# Patient Record
Sex: MALE | Race: WHITE | Employment: FULL TIME | URBAN - METROPOLITAN AREA
[De-identification: names, ages, dates, MRNs, and addresses within clinical notes are randomized per-mention and may not be internally consistent; named-entity substitution may affect disease eponyms.]

---

## 2020-05-15 ENCOUNTER — VIRTUAL VISIT (OUTPATIENT)
Dept: PHYSICAL THERAPY | Facility: CLINIC | Age: 55
End: 2020-05-15
Payer: COMMERCIAL

## 2020-05-15 DIAGNOSIS — M25.511 CHRONIC RIGHT SHOULDER PAIN: ICD-10-CM

## 2020-05-15 DIAGNOSIS — G89.29 CHRONIC RIGHT SHOULDER PAIN: ICD-10-CM

## 2020-05-15 PROCEDURE — 97110 THERAPEUTIC EXERCISES: CPT | Mod: 95 | Performed by: PHYSICAL THERAPIST

## 2020-05-15 PROCEDURE — 97161 PT EVAL LOW COMPLEX 20 MIN: CPT | Mod: 95 | Performed by: PHYSICAL THERAPIST

## 2020-05-15 NOTE — PROGRESS NOTES
"Physical Therapy Virtual Follow Up Visit      The patient has been notified of following:     \"This virtual visit will be conducted between you and your provider. We have found that certain health care needs can be provided without the need for physical presence.  This service lets us provide the care you need with a virtual visit.\"    Due to external, as well as internal [facility] management of the COVID-19 Virus, Samuel Cuevas was not seen in our clinic.  As a substitution, we implemented a virtual visit to manage this patient's condition utilizing the PTRx virtual visit platform via the patient s existing code.  The provider, Kolby Reza, reviewed the patient's chart, PTRx prescription, and spoke with the patient to determine the following telemedicine visit is appropriate and effective for the patient's care.    The following type of visit was completed:   Video Visit:  The PTRx platform uses a synchronous HIPAA compliant video stream for this patient encounter.      [facility]: Physical Therapy Initial Evaluation   May 15, 2020  Comments/Precautions/Restrictions/Physician instructions:   Per Orders: Right side rotator cuff tendinitis    Subjective:   Chief Complaint: right greater than left shoulder pain   Pain: top of both shoulders, right more than left   Numbness/Tingling: none   Weakness: none   Stiffness: tension in the front of shoulders, and in the shoulder overall.   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, driving  Previous Treatment (Effect): aspirin/ibuprofen (not consistently, helps a little)  Imaging: None for the shoulder  AM/PM: worse as the day goes on  Quality of Pain: aches, can be fairly sharp,  Pain: 2/10 at present, 2/10 at best, 6/10 at worst  Worse: laying on either side, mostly in the evening, putting too much weight on it, reaching (up, twisting), putting on a coat,   Better: meds,   Progression of Symptoms since onset: gradually getting worse   Sleeping: Not terrible, doesn't wake him up but he can feel it when he shifts   Current Functional Status:   - reaching - pain with reaching/putting on a coat   - laying on the side - either side will cause the pain,  Worse at night.   Previous Functional Status: No restrictions  Current HEP/exercise regimen: regular exercise plyometrics, biking, skiing, hockey, lots of building/remodeling,   Hand/Leg Dominance: right handed  Live with Others: Live alone, no pets    Patient's goal(s): get it loosened up a little bit. Regain mobility. Reduce pain.       Objective:    Standing Posture: mild-moderate forward head posture    Scapular Positioning: depressed right shoulder compared to the left, this is WNL.     Shoulder: (* indicates patient's pain)   AROM R AROM L   Flex/  Elevation Min* - painful arc Min+   Abd Mod* Mod+   ER Mod* Mod-charlene   IR/Ext Min* Mod - just tight      Scapulothoracic Rhythm: some pain, decreased scapular mobility, increased lumbar extension in attempt to compensate      Special tests:   R L   Impingement     Neer's (+)    Hawkin's-Zion (+)        Other:   - lumbar extension with shoulder flexion also observed during lat stretch from hooklying.       PTRx Content from today's visit:    New:  Exercise Name: Warmth - Reps: 5-15 mintues - Sessions: 1-2, Notes: Start with a shorter duration. If not worse, increase duration.   Exercise Name: Backlying Arms Overhead, Sets: 5 - Reps: 10 seconds - Sessions:  2  Exercise Name: Pec Stretch Doorway, Sets: 5 - Reps: 10 seconds - Sessions: 2, Notes: One arm at a time  Exercise Name: Shoulder External Rotation Sidelying, Sets: 1 - Reps: work up to 30 - Sessions: 1, Notes: Increase weight in accordance with strength progression  Exercise Name: Shoulder Strengthening Instructions              Assessment/Plan:    Patient is a 54 year old male with both sides shoulder complaints.    Patient has the following significant findings with corresponding treatment plan.                Referring Diagnosis: Right Side Rotator Cuff Tendinitis  PT Diagnosis: Bilateral shoulder pain with mobility deficits  Pain -  hot/cold therapy, manual therapy, splint/taping/bracing/orthotics, self management, education and home program  Decreased ROM/flexibility - manual therapy, therapeutic exercise, therapeutic activity and home program  Decreased function - therapeutic activities and home program  Impaired posture - neuro re-education, therapeutic activities and home program      Diagnosis 2:  Bilateral knee pain - to be assessed at a future visit pending orders from physician        Therapy Evaluation Codes:   1) History comprised of:   Personal factors that impact the plan of care:      None.    Comorbidity factors that impact the plan of care are:      None.     Medications impacting care: Anti-inflammatory.  2) Examination of Body Systems comprised of:   Body structures and functions that impact the plan of care:      Cervical spine, Shoulder and Thoracic Spine.   Activity limitations that impact the plan of care are:      Laying down and Reaching.  3) Clinical presentation characteristics are:   Stable/Uncomplicated.  4) Decision-Making    Low complexity using standardized patient assessment instrument and/or measureable assessment of functional outcome.  Cumulative Therapy Evaluation is: Low complexity.    Previous and current functional limitations:  (See Goal Flow Sheet for this information)     Short term and Long term goals: (See Goal Flow Sheet for this information)     Communication ability:  Patient appears to be able to clearly communicate and understand verbal and written communication and follow directions correctly.  Treatment Explanation - The following has been discussed with the patient:   RX ordered/plan of care  Anticipated outcomes  Possible risks and side effects  This patient would benefit from PT intervention to resume normal activities.   Rehab potential is good.    Frequency:  1 X week, once daily  Duration:  for 4 weeks tapering to 2 X a month over 8 weeks  Discharge Plan:  Achieve all LTG.  Independent in home treatment program.  Reach maximal therapeutic benefit.    Please refer to the daily flowsheet for treatment today, total treatment time and time spent performing 1:1 timed codes.                   Virtual visit contact time    Time of service began: 11 AM  Time of service ended: 11:41 AM  Total Time for set up, visit, and documentation: 25 minutes    Payor: PREFERREDONE / Plan: PREFERREDONE OTHER PPO / Product Type: PPO /   .  Procedure Code/s   Therapeutic Exercise (50141): 19 minutes    I have reviewed the note as documented above.  This accurately captures the substance of my conversation with the patient.  Provider location: [place], [place] ([facility])  Patient location: [place], [place]

## 2020-05-15 NOTE — LETTER
"[facility]  [address]  [address]  [place] [address]  [phone]    May 18, 2020    Re: Samuel Cuevas   :   1965  MRN:  [id number]   REFERRING PHYSICIAN:   Jake DOAN PT    Date of Initial Evaluation:  5/15/20  Visits:  Rxs Used: 1  Reason for Referral:  Chronic right shoulder pain    EVALUATION SUMMARY    Physical Therapy Virtual Follow Up Visit      The patient has been notified of following:     \"This virtual visit will be conducted between you and your provider. We have found that certain health care needs can be provided without the need for physical presence.  This service lets us provide the care you need with a virtual visit.\"    Due to external, as well as internal [facility] management of the COVID-19 Virus, Samuel Cuevas was not seen in our clinic.  As a substitution, we implemented a virtual visit to manage this patient's condition utilizing the [employer] virtual visit platform via the patient s existing code.  The provider, Kolby Reza, reviewed the patient's chart, PTRx prescription, and spoke with the patient to determine the following telemedicine visit is appropriate and effective for the patient's care.    The following type of visit was completed:   Video Visit:  The U[employer] platform uses a synchronous HIPAA compliant video stream for this patient encounter.      [facility]: Physical Therapy Initial Evaluation   May 15, 2020  Comments/Precautions/Restrictions/Physician instructions:   Per Orders: Right side rotator cuff tendinitis    Subjective:   Chief Complaint: right greater than left shoulder pain  Re: Samuel Cuevas   :   1965     Pain: top of both shoulders, right more than left   Numbness/Tingling: none   Weakness: none   Stiffness: tension in the front of shoulders, and in the shoulder overall.   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, driving  Previous Treatment (Effect): aspirin/ibuprofen (not consistently, helps a little)  Imaging: None for the shoulder  AM/PM: worse as the day goes on  Quality of Pain: aches, can be fairly sharp,  Pain: 2/10 at present, 2/10 at best, 6/10 at worst  Worse: laying on either side, mostly in the evening, putting too much weight on it, reaching (up, twisting), putting on a coat,   Better: meds,   Progression of Symptoms since onset: gradually getting worse   Sleeping: Not terrible, doesn't wake him up but he can feel it when he shifts     Re: Samuel ANA PAULA Cuevas   :   1965    Current Functional Status:   - reaching - pain with reaching/putting on a coat   - laying on the side - either side will cause the pain,  Worse at night.   Previous Functional Status: No restrictions  Current HEP/exercise regimen: regular exercise plyometrics, biking, skiing, hockey, lots of building/remodeling,   Hand/Leg Dominance: right handed  Live with Others: Live alone, no pets    Patient's goal(s): get it loosened up a little bit. Regain mobility. Reduce pain.       Objective:    Standing Posture: mild-moderate forward head posture    Scapular Positioning: depressed right shoulder compared to the left, this is WNL.     Shoulder: (* indicates patient's pain)   AROM R AROM L   Flex/  Elevation Min* - painful arc Min+   Abd Mod* Mod+   ER Mod* Mod-charlene   IR/Ext Min* Mod - just tight      Scapulothoracic Rhythm: some pain, decreased scapular mobility, increased lumbar extension in attempt to  compensate      Special tests:   R L   Impingement     Neer's (+)    Hawkin's-Zion (+)          Re: Samuel Cuevas   :   1965      Other:   - lumbar extension with shoulder flexion also observed during lat stretch from hooklying.       PTRx Content from today's visit:    New:  Exercise Name: Warmth - Reps: 5-15 mintues - Sessions: 1-2, Notes: Start with a shorter duration. If not worse, increase duration.   Exercise Name: Backlying Arms Overhead, Sets: 5 - Reps: 10 seconds - Sessions: 2  Exercise Name: Pec Stretch Doorway, Sets: 5 - Reps: 10 seconds - Sessions: 2, Notes: One arm at a time  Exercise Name: Shoulder External Rotation Sidelying, Sets: 1 - Reps: work up to 30 - Sessions: 1, Notes: Increase weight in accordance with strength progression  Exercise Name: Shoulder Strengthening Instructions      Assessment/Plan:    Patient is a 54 year old male with both sides shoulder complaints.    Patient has the following significant findings with corresponding treatment plan.                Referring Diagnosis: Right Side Rotator Cuff Tendinitis  PT Diagnosis: Bilateral shoulder pain with mobility deficits  Pain -  hot/cold therapy, manual therapy, splint/taping/bracing/orthotics, self management, education and home program  Decreased ROM/flexibility - manual therapy, therapeutic exercise, therapeutic activity and home program  Decreased function - therapeutic activities and home program  Impaired posture - neuro re-education, therapeutic activities and home program      Diagnosis 2:  Bilateral knee pain - to be assessed at a future visit pending orders from physician        Therapy Evaluation Codes:   1) History comprised of:   Personal factors that impact the plan of care:      None.    Comorbidity factors that impact the plan of care are:      None.     Medications impacting care: Anti-inflammatory.  2) Examination of Body Systems comprised of:   Body structures and functions that impact the plan of  care:      Cervical spine, Shoulder and Thoracic Spine.   Activity limitations that impact the plan of care are:      Laying down and Reaching.    Re: Samuel Cuevas   :   1965  3) Clinical presentation characteristics are:   Stable/Uncomplicated.  4) Decision-Making    Low complexity using standardized patient assessment instrument and/or measureable assessment of functional outcome.  Cumulative Therapy Evaluation is: Low complexity.    Previous and current functional limitations:  (See Goal Flow Sheet for this information)    Short term and Long term goals: (See Goal Flow Sheet for this information)     Communication ability:  Patient appears to be able to clearly communicate and understand verbal and written communication and follow directions correctly.  Treatment Explanation - The following has been discussed with the patient:   RX ordered/plan of care  Anticipated outcomes  Possible risks and side effects  This patient would benefit from PT intervention to resume normal activities.   Rehab potential is good.    Frequency:  1 X week, once daily  Duration:  for 4 weeks tapering to 2 X a month over 8 weeks  Discharge Plan:  Achieve all LTG.  Independent in home treatment program.  Reach maximal therapeutic benefit.    Virtual visit contact time    Time of service began: 11 AM  Time of service ended: 11:41 AM  Total Time for set up, visit, and documentation: 25 minutes    Payor: PREFERREDONE / Plan: PREFERREDONE OTHER PPO / Product Type: PPO   .  Procedure Code/s   Therapeutic Exercise (43517): 19 minutes    I have reviewed the note as documented above.  This accurately captures the substance of my conversation with the patient.  Provider location: [place], [place] ([facility])  Patient location: [place], [place]    Thank you for your referral.    INQUIRIES  Therapist: KAYLA Perez [facility]  [id number] [place] [address]  [address]  [facility] [address]  Phone: [phone]  Fax: [phone]

## 2020-05-22 ENCOUNTER — VIRTUAL VISIT (OUTPATIENT)
Dept: PHYSICAL THERAPY | Facility: CLINIC | Age: 55
End: 2020-05-22
Payer: COMMERCIAL

## 2020-05-22 DIAGNOSIS — M25.511 CHRONIC RIGHT SHOULDER PAIN: ICD-10-CM

## 2020-05-22 DIAGNOSIS — G89.29 CHRONIC RIGHT SHOULDER PAIN: ICD-10-CM

## 2020-05-22 PROCEDURE — 97110 THERAPEUTIC EXERCISES: CPT | Mod: 95 | Performed by: PHYSICAL THERAPIST

## 2020-11-25 PROBLEM — M25.511 CHRONIC RIGHT SHOULDER PAIN: Status: RESOLVED | Noted: 2020-05-15 | Resolved: 2020-11-25

## 2020-11-25 PROBLEM — G89.29 CHRONIC RIGHT SHOULDER PAIN: Status: RESOLVED | Noted: 2020-05-15 | Resolved: 2020-11-25

## 2020-11-25 NOTE — PROGRESS NOTES
DISCHARGE SUMMARY    Samuel Cuevas was seen 2 times for evaluation and treatment.  Patient did not return for further treatment and current status is unknown.  Due to short treatment duration, no objective or functional changes were made.  Please see goal flow sheet from episode noted date below and initial evaluation for further information.  Patient is discharged from therapy and therapy episode is resolved as of 11/25/20.      No linked episodes
